# Patient Record
Sex: MALE | Race: ASIAN | URBAN - METROPOLITAN AREA
[De-identification: names, ages, dates, MRNs, and addresses within clinical notes are randomized per-mention and may not be internally consistent; named-entity substitution may affect disease eponyms.]

---

## 2017-07-17 ENCOUNTER — RADIANT APPOINTMENT (OUTPATIENT)
Dept: GENERAL RADIOLOGY | Facility: CLINIC | Age: 32
End: 2017-07-17
Attending: PODIATRIST
Payer: COMMERCIAL

## 2017-07-17 ENCOUNTER — OFFICE VISIT (OUTPATIENT)
Dept: PODIATRY | Facility: CLINIC | Age: 32
End: 2017-07-17
Payer: COMMERCIAL

## 2017-07-17 VITALS
HEIGHT: 61 IN | DIASTOLIC BLOOD PRESSURE: 60 MMHG | SYSTOLIC BLOOD PRESSURE: 102 MMHG | BODY MASS INDEX: 24.58 KG/M2 | WEIGHT: 130.2 LBS

## 2017-07-17 DIAGNOSIS — M25.80 SESAMOIDITIS: Primary | ICD-10-CM

## 2017-07-17 PROCEDURE — 99213 OFFICE O/P EST LOW 20 MIN: CPT | Performed by: PODIATRIST

## 2017-07-17 PROCEDURE — 73630 X-RAY EXAM OF FOOT: CPT | Mod: RT

## 2017-07-17 NOTE — PROGRESS NOTES
"PATIENT HISTORY:  Greg Perez is a 31 year old male who presents to clinic for new right foot pain.  Prior 1st toe pain has resolved.  Pt reports plantar 1st MPJ pain with activity on the R over recent weeks as he has started running.  1-6/10.  He just replaced his worn out shoes, which helps.  No new injury.  Denies fevers, numbness.  He limps at times.  Nonsmoker.  Not working.  Nondiabetic.       EXAM:Vitals: /60 (BP Location: Left arm, Patient Position: Chair, Cuff Size: Adult Regular)  Ht 5' 1\" (1.549 m)  Wt 130 lb 3.2 oz (59.1 kg)  BMI 24.6 kg/m2  BMI= Body mass index is 24.6 kg/(m^2).    General appearance: Patient is alert and fully cooperative with history & exam.  No sign of distress is noted during the visit.     Dermatologic: Skin is intact to the right foot without significant lesions, rash or abrasion.  No paronychia or evidence of soft tissue infection is noted.     Vascular: DP & PT pulses are intact & regular bilaterally.  No significant edema or varicosities noted.  CFT and skin temperature are normal to both lower extremities.     Neurologic: Lower extremity sensation is intact to light touch.  No evidence of weakness or contracture in the lower extremities.  No evidence of neuropathy.     Musculoskeletal: Mild pain with palpation of the right foot sesamoids.  No right 1st MPJ or IPJ pain with palpation or ROM.  Patient is ambulatory without assistive device or brace.  No gross ankle deformity noted.  No foot or ankle joint effusion is noted.    XRs of right foot reviewed with pt.  4 views plus sesamoid axial.  No acute findings.    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     ASSESSMENT: Right foot sesamoiditis     PLAN:  Reviewed patient's chart in epic.  Discussed condition and treatment options including pros and cons.    Advised PRICE, stiff soled shoes, orthotics and/or dancer's pad to offload.  No barefoot walking.  F/u in 6 " wks.  Handouts given.  Discussed PT after this initial period of rest.     Alvaro Land, FABIANO, FACFAS

## 2017-07-17 NOTE — MR AVS SNAPSHOT
After Visit Summary   7/17/2017    Greg Fabiola Perez    MRN: [id number]           Patient Information     Date Of Birth          1985        Visit Information        Provider Department      7/17/2017 9:20 AM Alvaro Land DPM [facility]        Today's Diagnoses     Sesamoiditis    -  1      Care Instructions    Try a dancer's pad to offload the sesamoids  Follow up in 6 weeks.    Sesamoid Injuries in the Foot  What is a Sesamoid?  A sesamoid is a bone embedded in a tendon. Sesamoids are found in several joints in the body. In the normal foot, the sesamoids are two pea-shaped bones located in the ball of the foot, beneath the big toe joint.  Acting as a pulley for tendons, the sesamoids help the big toe move normally and provide leverage when the big toe  pushes off  during walking and running. The sesamoids also serve as a weight-bearing surface for the first metatarsal bone (the long bone connected to the big toe), absorbing the weight placed on the ball of the foot when walking, running, and jumping.  Sesamoid injuries can involve the bones, tendons, and/or surrounding tissue in the joint. They are often associated with activities requiring increased pressure on the ball of the foot, such as running, basketball, football, golf, tennis, and ballet. In addition, people with high arches are at risk for developing sesamoid problems. Frequent wearing of high-heeled shoes can also be a contributing factor.  Types of Sesamoid Injuries in the Foot  There are three types of sesamoid injuries in the foot:  Turf toe. This is an injury of the soft tissue surrounding the big toe joint. It usually occurs when the big toe joint is extended beyond its normal range. Turf toe causes immediate, sharp pain and swelling. It usually affects the entire big toe joint and limits the motion of the toe. Turf toe may result in an injury to the soft tissue attached to the  sesamoid or a fracture of the sesamoid. Sometimes a  pop  is felt at the moment of injury.   Fracture. A fracture (break) in a sesamoid bone can be either acute or chronic.   An acute fracture is caused by trauma - a direct blow or impact to the bone. An acute sesamoid fracture produces immediate pain and swelling at the site of the break, but usually does not affect the entire big toe joint.   A chronic fracture is a stress fracture (a hairline break usually caused by repetitive stress or overuse). A chronic sesamoid fracture produces longstanding pain in the ball of the foot beneath the big toe joint. The pain, which tends to come and go, generally is aggravated with activity and relieved with rest.  Sesamoiditis. This is an overuse injury involving chronic inflammation of the sesamoid bones and the tendons involved with those bones. Sesamoiditis is caused by increased pressure to the sesamoids. Often, sesamoiditis is associated with a dull, longstanding pain beneath the big toe joint. The pain comes and goes, usually occurring with certain shoes or certain activities.  Diagnosis  In diagnosing a sesamoid injury, the foot and ankle surgeon will examine the foot, focusing on the big toe joint. The surgeon will press on the big toe, move it up and down, and may assess the patient s walking and evaluate the wear pattern on the patient s shoes. X-rays are ordered, and in some cases, advanced imaging studies may be ordered.  Non-Surgical Treatment  Non-surgical treatment for sesamoid injuries of the foot may include one or more of the following options, depending on the type of injury and degree of severity:  Padding, strapping, or taping. A pad may be placed in the shoe to cushion the inflamed sesamoid area, or the toe may be taped or strapped to relieve that area of tension.   Immobilization. The foot may be placed in a cast or removable walking cast. Crutches may be used to prevent placing weight on the foot.   Oral  medications. Nonsteroidal anti-inflammatory drugs (NSAIDs), such as ibuprofen, are often helpful in reducing the pain and inflammation.   Physical therapy. The rehabilitation period following immobilization sometimes includes physical therapy, such as exercises (range-of-motion, strengthening, and conditioning) and ultrasound therapy.   Steroid injections. In some cases, cortisone is injected in the joint to reduce pain and inflammation.   Orthotic devices. Custom orthotic devices that fit into the shoe may be prescribed for long-term treatment of sesamoiditis to balance the pressure placed on the ball of the foot.  When is Surgery Needed?  When sesamoid injuries fail to respond to non-surgical treatment, surgery may be required. The foot and ankle surgeon will determine the type of procedure that is best suited to the individual patient.      PRICE Therapy    Many aches and pains throughout the foot and ankle can be helped with many simple treatments.  This is usually described as PRICE Therapy.      P - Protection - often times, inflammation/pain in the lower extremity is not able to improve simply because the areas involved are never allowed to rest.  Every step we take can bother the problematic area.  Protecting those areas is an important step in the healing process.  This may involve a walking cast boot, a special insert/orthotic device, an ankle brace, or simply avoiding barefoot walking.    R - Rest - in addition to protecting the foot/ankle, resting is an important, but often times difficult, treatment option.  Getting off your feet when they bother you, and specifically avoiding activities that cause pain/discomfort, are very beneficial to prevent, and treat, foot/ankle pain.      I - Ice - icing regularly can help to decrease inflammation and swelling in the foot, thus decreasing pain.  Using an ice pack or a bag of frozen peas works very well.  Ice for 20 minutes multiple times per day as needed.  Do  not place the ice directly on the skin as this can cause tissue damage.    C - Compression - using a compression wrap or an ACE wrap can help to decrease swelling, which can help to decrease pain.  Wearing the wraps is generally not needed at night, but they should be worn on a regular basis when you are going to be on your feet for prolonged periods as gravity tends to pull fluids down to your feet/ankles.    E - Elevation - elevating your lower extremities multiple times daily for 15-20 minutes can help to decrease swelling, which works well in decreasing pain levels.      NSAID/Tylenol - An anti-inflammatory, like Aleve or ibuprofen, and/or a pain medication, such as Tylenol, can help to improve pain levels and get the issue resolved sooner rather than later.  Anyone with liver issues should be careful with Tylenol, and anyone with high blood pressure or heart, stomach or kidney issues should be careful with anti-inflammatories.  Please ask if you have questions about these medications, including dosage.          Over the Counter Inserts    Super Feet are the most common and easiest to find.    Locations include any [employer], [employer] in [place] on [address]  and in [place] on [address], [facility] Running Room in [place] on [address], [facility] Running Room in [place] on [address], [employer] in [place] on [address] and [employer] Sport [employer] in [place] on [address] and in [place] on [address].    Spenco can be found online and at [employer] Shoe Shop in [place] on [address], [employer]' [employer] in [facility] on [address], Gear Running Store in [place] on [address], [employer] in [place] on [address] and [employer] in [place] on [address].    Power Step can be a little harder to find.  Locations include [employer] in [place] on [address], [place] in [place], [employer]-over Store in [place] on [address] and online    Walk-Fit - [employer]     Arch  "[facility]    **  A good high quality over the counter insert can cost around $40-$50.              Follow-ups after your visit        Who to contact     If you have questions or need follow up information about today's clinic visit or your schedule please contact [facility] directly at [phone].  Normal or non-critical lab and imaging results will be communicated to you by MyChart, letter or phone within 4 business days after the clinic has received the results. If you do not hear from us within 7 days, please contact the clinic through [employer] or phone. If you have a critical or abnormal lab result, we will notify you by phone as soon as possible.  Submit refill requests through [employer] or call your pharmacy and they will forward the refill request to us. Please allow 3 business days for your refill to be completed.          Additional Information About Your Visit        [employer] Information     [employer] lets you send messages to your doctor, view your test results, renew your prescriptions, schedule appointments and more. To sign up, go to [email or web address] . Click on \"Log in\" on the left side of the screen, which will take you to the Welcome page. Then click on \"Sign up Now\" on the right side of the page.     You will be asked to enter the access code listed below, as well as some personal information. Please follow the directions to create your username and password.     Your access code is: [email or web address]  Expires: 10/15/2017 10:19 AM     Your access code will  in 90 days. If you need help or a new code, please call your [facility] or [phone].        Care EveryWhere ID     This is your Care EveryWhere ID. This could be used by other organizations to access your [facility] medical records  [id number]        Your Vitals Were     Height BMI (Body Mass Index)                5' 1\" (1.549 m) 24.6 kg/m2           Blood Pressure from Last 3 " Encounters:   07/17/17 102/60   12/05/16 101/57   11/14/16 118/58    Weight from Last 3 Encounters:   07/17/17 130 lb 3.2 oz (59.1 kg)   12/05/16 124 lb (56.2 kg)   11/14/16 124 lb 6.4 oz (56.4 kg)              We Performed the Following     XR Foot Right G/E 3 Views        Primary Care Provider    None Specified       No primary provider on file.        Equal Access to Services     MISSY TAVERAS : Hadii aad ku hadasho Soomaali, waaxda luqadaha, qaybta kaalmada adeegyada, waxay wongin jyotin abelino batres . So [facility] [phone].    ATENCIÓN: Si habla español, tiene a john disposición servicios gratuitos de asistencia lingüística. Llame al [phone].    We comply with applicable federal civil rights laws and [place] laws. We do not discriminate on the basis of race, color, national origin, age, disability sex, sexual orientation or gender identity.            Thank you!     Thank you for choosing [facility]  for your care. Our goal is always to provide you with excellent care. Hearing back from our patients is one way we can continue to improve our services. Please take a few minutes to complete the written survey that you may receive in the mail after your visit with us. Thank you!             Your Updated Medication List - Protect others around you: Learn how to safely use, store and throw away your medicines at www.disposemymeds.org.          This list is accurate as of: 7/17/17 10:19 AM.  Always use your most recent med list.                   Brand Name Dispense Instructions for use Diagnosis    order for DME     1 Device    Equipment being ordered: Post op shoe Women's Large    Toe injury, right, initial encounter

## 2017-07-17 NOTE — PATIENT INSTRUCTIONS
Try a dancer's pad to offload the sesamoids  Follow up in 6 weeks.    Sesamoid Injuries in the Foot  What is a Sesamoid?  A sesamoid is a bone embedded in a tendon. Sesamoids are found in several joints in the body. In the normal foot, the sesamoids are two pea-shaped bones located in the ball of the foot, beneath the big toe joint.  Acting as a pulley for tendons, the sesamoids help the big toe move normally and provide leverage when the big toe  pushes off  during walking and running. The sesamoids also serve as a weight-bearing surface for the first metatarsal bone (the long bone connected to the big toe), absorbing the weight placed on the ball of the foot when walking, running, and jumping.  Sesamoid injuries can involve the bones, tendons, and/or surrounding tissue in the joint. They are often associated with activities requiring increased pressure on the ball of the foot, such as running, basketball, football, golf, tennis, and ballet. In addition, people with high arches are at risk for developing sesamoid problems. Frequent wearing of high-heeled shoes can also be a contributing factor.  Types of Sesamoid Injuries in the Foot  There are three types of sesamoid injuries in the foot:  Turf toe. This is an injury of the soft tissue surrounding the big toe joint. It usually occurs when the big toe joint is extended beyond its normal range. Turf toe causes immediate, sharp pain and swelling. It usually affects the entire big toe joint and limits the motion of the toe. Turf toe may result in an injury to the soft tissue attached to the sesamoid or a fracture of the sesamoid. Sometimes a  pop  is felt at the moment of injury.   Fracture. A fracture (break) in a sesamoid bone can be either acute or chronic.   An acute fracture is caused by trauma - a direct blow or impact to the bone. An acute sesamoid fracture produces immediate pain and swelling at the site of the break, but usually does not affect the entire big  toe joint.   A chronic fracture is a stress fracture (a hairline break usually caused by repetitive stress or overuse). A chronic sesamoid fracture produces longstanding pain in the ball of the foot beneath the big toe joint. The pain, which tends to come and go, generally is aggravated with activity and relieved with rest.  Sesamoiditis. This is an overuse injury involving chronic inflammation of the sesamoid bones and the tendons involved with those bones. Sesamoiditis is caused by increased pressure to the sesamoids. Often, sesamoiditis is associated with a dull, longstanding pain beneath the big toe joint. The pain comes and goes, usually occurring with certain shoes or certain activities.  Diagnosis  In diagnosing a sesamoid injury, the foot and ankle surgeon will examine the foot, focusing on the big toe joint. The surgeon will press on the big toe, move it up and down, and may assess the patient s walking and evaluate the wear pattern on the patient s shoes. X-rays are ordered, and in some cases, advanced imaging studies may be ordered.  Non-Surgical Treatment  Non-surgical treatment for sesamoid injuries of the foot may include one or more of the following options, depending on the type of injury and degree of severity:  Padding, strapping, or taping. A pad may be placed in the shoe to cushion the inflamed sesamoid area, or the toe may be taped or strapped to relieve that area of tension.   Immobilization. The foot may be placed in a cast or removable walking cast. Crutches may be used to prevent placing weight on the foot.   Oral medications. Nonsteroidal anti-inflammatory drugs (NSAIDs), such as ibuprofen, are often helpful in reducing the pain and inflammation.   Physical therapy. The rehabilitation period following immobilization sometimes includes physical therapy, such as exercises (range-of-motion, strengthening, and conditioning) and ultrasound therapy.   Steroid injections. In some cases, cortisone  is injected in the joint to reduce pain and inflammation.   Orthotic devices. Custom orthotic devices that fit into the shoe may be prescribed for long-term treatment of sesamoiditis to balance the pressure placed on the ball of the foot.  When is Surgery Needed?  When sesamoid injuries fail to respond to non-surgical treatment, surgery may be required. The foot and ankle surgeon will determine the type of procedure that is best suited to the individual patient.      PRICE Therapy    Many aches and pains throughout the foot and ankle can be helped with many simple treatments.  This is usually described as PRICE Therapy.      P - Protection - often times, inflammation/pain in the lower extremity is not able to improve simply because the areas involved are never allowed to rest.  Every step we take can bother the problematic area.  Protecting those areas is an important step in the healing process.  This may involve a walking cast boot, a special insert/orthotic device, an ankle brace, or simply avoiding barefoot walking.    R - Rest - in addition to protecting the foot/ankle, resting is an important, but often times difficult, treatment option.  Getting off your feet when they bother you, and specifically avoiding activities that cause pain/discomfort, are very beneficial to prevent, and treat, foot/ankle pain.      I - Ice - icing regularly can help to decrease inflammation and swelling in the foot, thus decreasing pain.  Using an ice pack or a bag of frozen peas works very well.  Ice for 20 minutes multiple times per day as needed.  Do not place the ice directly on the skin as this can cause tissue damage.    C - Compression - using a compression wrap or an ACE wrap can help to decrease swelling, which can help to decrease pain.  Wearing the wraps is generally not needed at night, but they should be worn on a regular basis when you are going to be on your feet for prolonged periods as gravity tends to pull fluids  down to your feet/ankles.    E - Elevation - elevating your lower extremities multiple times daily for 15-20 minutes can help to decrease swelling, which works well in decreasing pain levels.      NSAID/Tylenol - An anti-inflammatory, like Aleve or ibuprofen, and/or a pain medication, such as Tylenol, can help to improve pain levels and get the issue resolved sooner rather than later.  Anyone with liver issues should be careful with Tylenol, and anyone with high blood pressure or heart, stomach or kidney issues should be careful with anti-inflammatories.  Please ask if you have questions about these medications, including dosage.          Over the Counter Inserts    Super Feet are the most common and easiest to find.    Locations include any [place], [employer] in [place] on [address] and in [place] on [address], [facility] Running Room in [facility] on [address] [facility] Running Room in [place] on [address], [place] in [place] on [address] and [employer] Shop in [facility] on [place] and in [place] on [address].    Spenco can be found online and at [employer]oe [employer] in [facility] on [address], [employer] N' Fun in [facility] on [address], Gear Running Store in [place] on [address], Functional [employer] in [place] on [address] and [employer] in [place] on [address].    Power Step can be a little harder to find.  Locations include [employer] in [place] on [address], [place] in [facility], Stop-over Store in [place] on [address] and online    Walk-Fit - Target     [facility]    **  A good high quality over the counter insert can cost around $40-$50.

## 2017-07-17 NOTE — NURSING NOTE
"Chief Complaint   Patient presents with     Foot Pain     Follow up R foot        Initial /60 (BP Location: Left arm, Patient Position: Chair, Cuff Size: Adult Regular)  Ht 5' 1\" (1.549 m)  Wt 130 lb 3.2 oz (59.1 kg)  BMI 24.6 kg/m2 Estimated body mass index is 24.6 kg/(m^2) as calculated from the following:    Height as of this encounter: 5' 1\" (1.549 m).    Weight as of this encounter: 130 lb 3.2 oz (59.1 kg).  Medication Reconciliation: vincent Ferrer MA July 17, 2017 9:37 AM  "

## 2017-10-19 ENCOUNTER — TELEPHONE (OUTPATIENT)
Dept: PODIATRY | Facility: CLINIC | Age: 32
End: 2017-10-19

## 2017-10-19 NOTE — TELEPHONE ENCOUNTER
Referral needed for physical therapy in [place], [place]. Patient prefers [facility]  Physical therapy clinic in [place], [place]  Address: [address], [place] [address]  Phone: [phone]   [phone]

## 2017-10-24 NOTE — TELEPHONE ENCOUNTER
Please advise if you would like patient to follow up in clinic prior to sending physical therapy orders.

## 2017-11-03 ENCOUNTER — TELEPHONE (OUTPATIENT)
Dept: PODIATRY | Facility: CLINIC | Age: 32
End: 2017-11-03

## 2017-11-03 NOTE — TELEPHONE ENCOUNTER
Reason for Call:  Other call back    Detailed comments: Patient is in [facility] and will not [place] the cities until mid [place]. He is wondering if Dr. Land has any recommendations on who he should see for a follow up. Please advise, thank you!    Phone Number Patient can be reached at: Home number on file [phone] (home)    Best Time: anytime    Can we leave a detailed message on this number? YES    Call taken on 11/3/2017 at 12:59 PM by Aure Ortiz

## 2017-11-03 NOTE — TELEPHONE ENCOUNTER
I don't know any podiatrists personally in [place], there are podiatric physicians at the [facility] in [place] that could help.

## 2017-11-06 NOTE — TELEPHONE ENCOUNTER
Called patient and left a voicemail to call back.    HORACIO Ferrer MA November 6, 2017 12:50 PM